# Patient Record
Sex: FEMALE | Race: WHITE | URBAN - METROPOLITAN AREA
[De-identification: names, ages, dates, MRNs, and addresses within clinical notes are randomized per-mention and may not be internally consistent; named-entity substitution may affect disease eponyms.]

---

## 2018-04-13 ENCOUNTER — OFFICE VISIT (OUTPATIENT)
Dept: FAMILY MEDICINE CLINIC | Age: 28
End: 2018-04-13

## 2018-04-13 VITALS
SYSTOLIC BLOOD PRESSURE: 115 MMHG | HEIGHT: 64 IN | HEART RATE: 65 BPM | DIASTOLIC BLOOD PRESSURE: 72 MMHG | TEMPERATURE: 98.2 F | BODY MASS INDEX: 19.81 KG/M2 | WEIGHT: 116 LBS

## 2018-04-13 DIAGNOSIS — B35.4 TINEA CORPORIS: Primary | ICD-10-CM

## 2018-04-13 RX ORDER — KETOCONAZOLE 20 MG/G
CREAM TOPICAL 2 TIMES DAILY
Qty: 30 G | Refills: 1 | Status: SHIPPED | OUTPATIENT
Start: 2018-04-13

## 2018-04-13 NOTE — PROGRESS NOTES
Subjective:     Chief Complaint   Patient presents with    Rash     x1 month. on both legs. Pt is not sure if meds are working for rash. She  is a 33 y.o. female who presents for evaluation of new onset rash. Rash onset approx 2 months ago. Rash mostly on bilateral ankles/feet, started on L then spread to R ankle. Notes pruritus only corresponding S&S, no discharge nor erythema/edema. Has attempted relief with OTC steroid cream, thinks it is helping some. Denies any contact/exposure, noting her BF doesn't have it. Also denies any change in cosmetics/hygiene products. PMH: TTP prior to surg below    Surg:  Splenectomy     NKDA    Current Rx/supplements: Denies     Social:  Pt denies tobacco, etoh nor drug use. Pt is currently traveling, originally from [facility]. Family Hx: denies       ROS  Gen - no fever/chills  Resp - no dyspnea or cough  CV - no chest pain or KAUR  Rest per HPI    No past medical history on file. No past surgical history on file. No current outpatient prescriptions on file prior to visit. No current facility-administered medications on file prior to visit. Objective:     Vitals:    04/13/18 0932   BP: 115/72   Pulse: 65   Temp: 98.2 °F (36.8 °C)   TempSrc: Oral   Weight: 116 lb (52.6 kg)   Height: 5' 3.78\" (1.62 m)       Physical Examination:  General appearance - alert, well appearing, and in no distress  Eyes -sclera anicteric  Neck - supple, no significant adenopathy, no thyromegaly  Chest - clear to auscultation, no wheezes, rales or rhonchi, symmetric air entry  Heart - normal rate, regular rhythm, normal S1, S2, no murmurs, rubs, clicks or gallops  Neurological - alert, oriented, no focal findings or movement disorder noted    Medial ankles: noted erythematous, annular papules scattered about medial malleous. Assessment/ Plan:   Diagnoses and all orders for this visit:    1.  Tinea corporis  -     ketoconazole (NIZORAL) 2 % topical cream; Apply  to affected area two (2) times a day. Strong suspicion of fungal cause of rash. Start BID Nizoral x 1-2 weeks then as rash resolves, cont applying x 1 week more to confirm resolution. F/u with PCP in [facility] if no improvement. RTC PRN. I have discussed the diagnosis with the patient and the intended plan as seen in the above orders. The patient has received an after-visit summary and questions were answered concerning future plans. I have discussed medication side effects and warnings with the patient as well. The patient verbalizes understanding and agreement with the plan.     Follow-up Disposition: Not on File

## 2018-04-13 NOTE — PATIENT INSTRUCTIONS
Ringworm: Care Instructions  Your Care Instructions  Ringworm is a fungus infection of the skin. It is not caused by a worm. Ringworm causes a round, scaly rash that may crack and itch. The rash can spread over a wide area. One type of fungus that causes ringworm is often found in locker rooms and swimming pools. It grows well in warm, moist areas of the skin, such as in skin folds. You can get ringworm by sharing towels, clothing, and sports equipment. You can also get it by touching someone who has ringworm. Ringworm is treated with cream that kills the fungus. If the rash is widespread, you may need pills to get rid of it. Ringworm often comes back after treatment. If the rash becomes infected with bacteria, you may need antibiotics. Follow-up care is a key part of your treatment and safety. Be sure to make and go to all appointments, and call your doctor if you are having problems. It's also a good idea to know your test results and keep a list of the medicines you take. How can you care for yourself at home? · Take your medicines exactly as prescribed. Call your doctor if you have any problems with your medicine. · Wash the rash with soap and water, remove flaky skin, and dry thoroughly. · Try an over-the-counter cream with clotrimazole or miconazole in it. Brand names include Lotrimin, Micatin, and Tinactin. Terbinafine cream (Lamisil) is also available without a prescription. Spread the cream beyond the edge or border of the rash. Follow the directions on the package. Do not stop using the medicine just because your skin clears up. You will probably need to continue treatment for 2 to 4 weeks. · To keep from getting another infection:  ¨ Do not go barefoot in public places such as gyms or locker rooms. Avoid sharing towels and clothes. Use flip-flops or some other type of shoe in the shower.   ¨ Do not wear tight clothes or let your skin stay damp for long periods, such as by staying in a wet bathing suit or sweaty clothes. When should you call for help? Call your doctor now or seek immediate medical care if:  ? · You have signs of infection such as:  ¨ Pain, warmth, or swelling in your skin. ¨ Red streaks near a wound in the skin. ¨ Pus coming from the rash on your skin. ¨ A fever. ? Watch closely for changes in your health, and be sure to contact your doctor if:  ? · Your ringworm does not improve after 2 weeks of treatment. ? · You do not get better as expected. Where can you learn more? Go to http://jelani-travis.info/. Enter N540 in the search box to learn more about \"Ringworm: Care Instructions. \"  Current as of: October 13, 2016  Content Version: 11.4  © 3372-2158 [employer]. Care instructions adapted under license by [employer] (which disclaims liability or warranty for this information). If you have questions about a medical condition or this instruction, always ask your healthcare professional. [place] [address] any warranty or liability for your use of this information.

## 2018-04-13 NOTE — MR AVS SNAPSHOT
[address] [place] [address] 
[phone] Patient: Temi Velazquez MRN: [id number] PXR:[id number] Visit Information Date & Time Provider Department Dept. Phone Encounter #  
 4/13/2018  9:30 AM SLIME Uriosteguiyra Ruddy [facility] [phone] [id number] Upcoming Health Maintenance Date Due  
 HPV Age 9Y-34Y (1 of 3 - Female 3 Dose Series) 7/7/2002 DTaP/Tdap/Td series (1 - Tdap) 7/7/2012 PAP AKA CERVICAL CYTOLOGY 7/7/2012 Influenza Age 5 to Adult 8/1/2017 Allergies as of 4/13/2018  Review Complete On: 4/13/2018 By: Yared Knox NP No Known Allergies Current Immunizations  Never Reviewed No immunizations on file. Not reviewed this visit You Were Diagnosed With   
  
 Codes Comments Tinea corporis    -  Primary ICD-10-CM: B35.4 ICD-9-CM: 110.5 Vitals BP Pulse Temp Height(growth percentile) Weight(growth percentile) LMP  
 115/72 (BP 1 Location: Right arm) 65 98.2 °F (36.8 °C) (Oral) 5' 3.78\" (1.62 m) 116 lb (52.6 kg) 03/29/2018 BMI OB Status Smoking Status 20.05 kg/m2 Having regular periods Never Smoker Vitals History BMI and BSA Data Body Mass Index Body Surface Area 20.05 kg/m 2 1.54 m 2 Preferred Pharmacy Pharmacy Name Phone [address] [phone] Your Updated Medication List  
  
   
This list is accurate as of 4/13/18 11:06 AM.  Always use your most recent med list.  
  
  
  
  
 ketoconazole 2 % topical cream  
Commonly known as:  Deisi Cera Apply  to affected area two (2) times a day. Prescriptions Sent to Pharmacy Refills  
 ketoconazole (NIZORAL) 2 % topical cream 1 Sig: Apply  to affected area two (2) times a day. Class: Normal  
 Pharmacy: [facility] [address] #: [phone] Route: Topical  
  
Patient Instructions Ringworm: Care Instructions Your Care Instructions Ringworm is a fungus infection of the skin. It is not caused by a worm. Ringworm causes a round, scaly rash that may crack and itch. The rash can spread over a wide area. One type of fungus that causes ringworm is often found in locker rooms and swimming pools. It grows well in warm, moist areas of the skin, such as in skin folds. You can get ringworm by sharing towels, clothing, and sports equipment. You can also get it by touching someone who has ringworm. Ringworm is treated with cream that kills the fungus. If the rash is widespread, you may need pills to get rid of it. Ringworm often comes back after treatment. If the rash becomes infected with bacteria, you may need antibiotics. Follow-up care is a key part of your treatment and safety. Be sure to make and go to all appointments, and call your doctor if you are having problems. It's also a good idea to know your test results and keep a list of the medicines you take. How can you care for yourself at home? · Take your medicines exactly as prescribed. Call your doctor if you have any problems with your medicine. · Wash the rash with soap and water, remove flaky skin, and dry thoroughly. · Try an over-the-counter cream with clotrimazole or miconazole in it. Brand names include Lotrimin, Micatin, and Tinactin. Terbinafine cream (Lamisil) is also available without a prescription. Spread the cream beyond the edge or border of the rash. Follow the directions on the package. Do not stop using the medicine just because your skin clears up. You will probably need to continue treatment for 2 to 4 weeks. · To keep from getting another infection: ¨ Do not go barefoot in public places such as gyms or locker rooms. Avoid sharing towels and clothes. Use flip-flops or some other type of shoe in the shower. ¨ Do not wear tight clothes or let your skin stay damp for long periods, such as by staying in a wet bathing suit or sweaty clothes. When should you call for help? Call your doctor now or seek immediate medical care if: 
? · You have signs of infection such as: 
¨ Pain, warmth, or swelling in your skin. ¨ Red streaks near a wound in the skin. ¨ Pus coming from the rash on your skin. ¨ A fever. ? Watch closely for changes in your health, and be sure to contact your doctor if: 
? · Your ringworm does not improve after 2 weeks of treatment. ? · You do not get better as expected. Where can you learn more? Go to http://jelani-travis.info/. Enter A709 in the search box to learn more about \"Ringworm: Care Instructions. \" Current as of: October 13, 2016 Content Version: 11.4 © 8719-0021 [employer]. Care instructions adapted under license by "[employer]" (which disclaims liability or warranty for this information). If you have questions about a medical condition or this instruction, always ask your healthcare professional. [place] [address] any warranty or liability for your use of this information. Introducing [facility]! [facility] introduces [employer] Nano[employer] patient portal. Now you can access parts of your medical record, email your doctor's office, and request medication refills online. 1. In your internet browser, go to [email or web address] 2. Click on the First Time User? Click Here link in the Sign In box. You will see the New Member Sign Up page. 3. Enter your [employer] Access Code exactly as it appears below. You will not need to use this code after youve completed the sign-up process. If you do not sign up before the expiration date, you must request a new code. · [employer] Access Code: [email or web address] Expires: 7/12/2018 11:06 AM 
 
4.  Enter the last four digits of your Social Security Number (xxxx) and Date of Birth (mm/dd/yyyy) as indicated and click Submit. You will be taken to the next sign-up page. 5. Create a Yoolink ID. This will be your Yoolink login ID and cannot be changed, so think of one that is secure and easy to remember. 6. Create a Yoolink password. You can change your password at any time. 7. Enter your Password Reset Question and Answer. This can be used at a later time if you forget your password. 8. Enter your e-mail address. You will receive e-mail notification when new information is available in [address] 9. Click Sign Up. You can now view and download portions of your medical record. 10. Click the Download Summary menu link to download a portable copy of your medical information. If you have questions, please visit the Frequently Asked Questions section of the Yoolink website. Remember, Yoolink is NOT to be used for urgent needs. For medical emergencies, dial 911. Now available from your iPhone and Android! Please provide this summary of care documentation to your next provider. If you have any questions after today's visit, please call [phone].

## 2018-04-13 NOTE — PROGRESS NOTES
Printed AVS, provided to pt and reviewed. Pt indicated understanding and had no questions. Told pt that rx has been sent to pharmacy and they should be ready for  in approximately 2 hrs. Pt told to please present GoodRx. com coupon which we provide to your pharmacy to receive discounted matos.   Grace Bernal RN